# Patient Record
Sex: MALE | Race: WHITE | NOT HISPANIC OR LATINO | Employment: STUDENT | URBAN - METROPOLITAN AREA
[De-identification: names, ages, dates, MRNs, and addresses within clinical notes are randomized per-mention and may not be internally consistent; named-entity substitution may affect disease eponyms.]

---
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
## 2023-11-03 ENCOUNTER — OFFICE VISIT (OUTPATIENT)
Dept: FAMILY MEDICINE CLINIC | Facility: CLINIC | Age: 23
End: 2023-11-03
Payer: COMMERCIAL

## 2023-11-03 ENCOUNTER — PATIENT ROUNDING (BHMG ONLY) (OUTPATIENT)
Dept: FAMILY MEDICINE CLINIC | Facility: CLINIC | Age: 23
End: 2023-11-03
Payer: COMMERCIAL

## 2023-11-03 VITALS
OXYGEN SATURATION: 98 % | RESPIRATION RATE: 20 BRPM | HEART RATE: 66 BPM | BODY MASS INDEX: 33.43 KG/M2 | HEIGHT: 67 IN | SYSTOLIC BLOOD PRESSURE: 148 MMHG | WEIGHT: 213 LBS | DIASTOLIC BLOOD PRESSURE: 96 MMHG

## 2023-11-03 DIAGNOSIS — Z31.69 INFERTILITY COUNSELING: Primary | ICD-10-CM

## 2023-11-03 DIAGNOSIS — G89.29 CHRONIC LOW BACK PAIN WITHOUT SCIATICA, UNSPECIFIED BACK PAIN LATERALITY: ICD-10-CM

## 2023-11-03 DIAGNOSIS — M54.50 CHRONIC LOW BACK PAIN WITHOUT SCIATICA, UNSPECIFIED BACK PAIN LATERALITY: ICD-10-CM

## 2023-11-03 NOTE — PROGRESS NOTES
A Isentio message has been sent to the patient for PATIENT ROUNDING with [facility] [place]

## 2023-11-03 NOTE — PROGRESS NOTES
Han Mcconnell,   [facility]  [address]  [place] [address] [phone]    Subjective      Name Shilo Lyon Jr. MRN [id number]    2000 AGE/SEX 22 y.o. / male      Chief Complaint Chief Complaint   Patient presents with    Establish Care     New patient here to establish care          Visit History for  2023    History of Present Illness  Shilo Lyon Jr. is a 22 y.o. male who presented today for Establish Care (New patient here to establish care )    Has been a long period of time since patient has been to a primary care physician. Had tonsils removed as a child. Does not frequently become ill, except from abiel COVID-19 in the last 2 years and intermittent allergies. Works at [employer] as a . 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 Tried to catch the box and believes sciatic nerve was pinched because of experiencing shooting pain down lower extremities. No longer feels symptoms.     Picked up full time college classes in business administration in the last month. Works 11 hour shifts from 2:00 PM to 1:00 AM 5 days out of the week, sometimes 6. Feels overwhelmed and is concerned it is impacting blood pressure. Has had this issue for about 2 years but waited to see a physician. Does not own a blood pressure cuff. Systolic pressure checked today, 2023, at 152 then rechecked at 140.    Weight was 170 pounds is 2017. Weight is 213 pounds today, 2023. History of diabetes on father's side. No known medical issues in parents or siblings. Parents are still living.     Not taking any medications currently. Highly allergic to penicillin.     Wife, who is having a surge, was seen in office and expressed concern about trying for pregnancy. Has been keeping track of cycles and using ovulation kits. Is concerned about chances of pregnancy. Is slightly concerned about  "fertility issues, but does not think there is anything serious going on. Wife's parents took 2 or 3 years to have her, then quickly had 2 children after. Expressed that he is 22-years-old and she is 21-years-old so there is no rush and he is not very concerned. Have been trying for pregnancy for 2 years, aside from a 4 month break due to stress.      Medications and Allergies   No current outpatient medications  Allergies   Allergen Reactions    Amoxicillin Anaphylaxis    Penicillins Anaphylaxis      I have reviewed the above medications and allergies     Objective:      Vitals Vitals:    11/03/23 0946 11/03/23 1005   BP: 152/100 148/96   BP Location: Right arm Right arm   Patient Position: Sitting Sitting   Cuff Size: Adult Adult   Pulse: 66    Resp: 20    SpO2: 98%    Weight: 96.6 kg (213 lb)    Height: 170.2 cm (67\")      Body mass index is 33.36 kg/m².    Physical Exam  Vitals reviewed.   Constitutional:       General: He is not in acute distress.     Appearance: He is not ill-appearing.   Cardiovascular:      Rate and Rhythm: Normal rate and regular rhythm.   Pulmonary:      Effort: Pulmonary effort is normal.      Breath sounds: Normal breath sounds.   Neurological:      Mental Status: He is alert.   Psychiatric:         Mood and Affect: Mood normal.         Behavior: Behavior normal.         Thought Content: Thought content normal.         Judgment: Judgment normal.            Assessment/Plan      Issues Addressed/ Plan  1. Infertility counseling  -Educated on the factor that stress plays in trying for pregnancy. Extended offer for semen analysis, which he is willing to do. Advised to try relaxation techniques to try and lower stress. Advised to go outside and be exposed to sunlight to increase testosterone, or to start taking vitamin D, zinc, or a multivitamin.     2. Chronic low back pain without sciatica, unspecified back pain laterality  - Advised patient to be more careful when lifting heavy items and " avoid if possible. Educated about sciatic nerve pain and how there is a chance of return. If he has pain while at home, advised him to reach out for an adjustment.     There are no Patient Instructions on file for this visit.  BMI is >= 30 and <35. (Class 1 Obesity). The following options were offered after discussion;: exercise counseling/recommendations and nutrition counseling/recommendations        Follow up  recommended Return in about 1 month (around 12/3/2023) for Blood Pressure.   - Dragon voice recognition software was utilized to complete this chart.  Every reasonable attempt was made to edit and correct the text, however some incorrect words may remain.   Han Mcconnell DO    Transcribed from ambient dictation for Han Mcconnell DO by Lis Serrano.  11/03/23   14:12 EDT    Patient or patient representative verbalized consent to the visit recording.  I have personally performed the services described in this document as transcribed by the above individual, and it is both accurate and complete.

## 2023-12-01 ENCOUNTER — OFFICE VISIT (OUTPATIENT)
Dept: FAMILY MEDICINE CLINIC | Facility: CLINIC | Age: 23
End: 2023-12-01
Payer: COMMERCIAL

## 2023-12-01 VITALS
DIASTOLIC BLOOD PRESSURE: 86 MMHG | WEIGHT: 214 LBS | SYSTOLIC BLOOD PRESSURE: 158 MMHG | RESPIRATION RATE: 18 BRPM | HEIGHT: 67 IN | BODY MASS INDEX: 33.59 KG/M2 | OXYGEN SATURATION: 98 % | HEART RATE: 80 BPM

## 2023-12-01 DIAGNOSIS — I10 HYPERTENSION, UNSPECIFIED TYPE: Primary | ICD-10-CM

## 2023-12-01 DIAGNOSIS — R42 LIGHTHEADED: ICD-10-CM

## 2023-12-01 PROCEDURE — 99213 OFFICE O/P EST LOW 20 MIN: CPT | Performed by: STUDENT IN AN ORGANIZED HEALTH CARE EDUCATION/TRAINING PROGRAM

## 2023-12-01 RX ORDER — LISINOPRIL 10 MG/1
10 TABLET ORAL DAILY
Qty: 30 TABLET | Refills: 0 | Status: SHIPPED | OUTPATIENT
Start: 2023-12-01

## 2023-12-01 NOTE — PROGRESS NOTES
"    Han Mcconnell DO  [facility] PRIMARY CARE  [address] [place] [address] [address]  [place] [address] [phone]    Subjective      Name Shilo Lyon Jr. MRN [id number]    2000 AGE/SEX 23 y.o. / male      Chief Complaint Chief Complaint   Patient presents with    Hypertension     1 month follow up for blood pressure          Visit History for  2023    History of Present Illness  Shilo Lyon Jr. is a 23 y.o. male who presented today for Hypertension (1 month follow up for blood pressure )    Blood pressure is still high. Has not been checking blood pressures at home. Wonders if this is a sign of high blood pressure, stress, or anxiety. For approximately 1 month more or less, when becoming excited or has been under pressure, will feel slightly off balance and slightly forgetful. Drinks 2 Cokes a day. Does not drink energy drinks. Does not drink coffee. Does not feel that heart is beating fast. Occasionally feels pressure. Stays up for 1 to 1.5 hours when going home from work.    Back is doing good.    Prefers to be updated on the fertility test.    Family history of diabetes.      Medications and Allergies   Current Outpatient Medications   Medication Instructions    lisinopril (PRINIVIL,ZESTRIL) 10 mg, Oral, Daily     Allergies   Allergen Reactions    Amoxicillin Anaphylaxis    Penicillins Anaphylaxis      I have reviewed the above medications and allergies     Objective:      Vitals Vitals:    23 1115   BP: 158/86   BP Location: Right arm   Patient Position: Sitting   Cuff Size: Adult   Pulse: 80   Resp: 18   SpO2: 98%   Weight: 97.1 kg (214 lb)   Height: 170.2 cm (67\")     Body mass index is 33.52 kg/m².    Physical Exam  Vitals reviewed.   Constitutional:       General: He is not in acute distress.     Appearance: He is not ill-appearing.   Cardiovascular:      Rate and Rhythm: Normal rate and regular rhythm.   Pulmonary:      Effort: Pulmonary effort is normal.      Breath " sounds: Normal breath sounds.   Neurological:      Mental Status: He is alert.   Psychiatric:         Mood and Affect: Mood normal.         Behavior: Behavior normal.         Thought Content: Thought content normal.         Judgment: Judgment normal.            Assessment/Plan      Issues Addressed/ Plan  1. Hypertension, unspecified type  - Will be ordered some blood tests to look at liver and kidney function, blood glucose levels, and thyroid function.  - Advised to check blood pressures every other day at home at the most relaxed times.  - Will be prescribed a high blood pressure medication of lisinopril (Prinivil, Zestril).  - Advised to follow up in 1 month for update on blood pressures.  - Comprehensive metabolic panel; Future  - TSH; Future  - T4, free; Future  - CBC w AUTO Differential; Future  - Lipid panel; Future  - lisinopril (PRINIVIL,ZESTRIL) 10 MG tablet; Take 1 tablet by mouth Daily.  Dispense: 30 tablet; Refill: 0    2. Lightheaded  - Comprehensive metabolic panel; Future  - TSH; Future  - T4, free; Future  - CBC w AUTO Differential; Future  - Lipid panel; Future     There are no Patient Instructions on file for this visit.         Follow up  recommended Return in about 1 month (around 1/1/2024) for Blood Pressure.   - Dragon voice recognition software was utilized to complete this chart.  Every reasonable attempt was made to edit and correct the text, however some incorrect words may remain.       Transcribed from ambient dictation for Han Mcconnell DO by Priti Negron.  12/01/23   14:11 EST    Patient or patient representative verbalized consent to the visit recording.  I have personally performed the services described in this document as transcribed by the above individual, and it is both accurate and complete.

## 2023-12-04 ENCOUNTER — TELEPHONE (OUTPATIENT)
Dept: FAMILY MEDICINE CLINIC | Facility: CLINIC | Age: 23
End: 2023-12-04
Payer: COMMERCIAL

## 2023-12-04 DIAGNOSIS — Z30.09 FAMILY PLANNING SERVICES: Primary | ICD-10-CM

## 2023-12-07 NOTE — TELEPHONE ENCOUNTER
May want to call patient as well and let him know that this is what we are doing in order to get his levels checked.  He will be expecting a referral, and was just thinking he was going to need a lab.

## 2023-12-29 DIAGNOSIS — I10 HYPERTENSION, UNSPECIFIED TYPE: ICD-10-CM

## 2023-12-29 RX ORDER — LISINOPRIL 10 MG/1
10 TABLET ORAL DAILY
Qty: 30 TABLET | Refills: 0 | Status: SHIPPED | OUTPATIENT
Start: 2023-12-29

## 2023-12-29 NOTE — TELEPHONE ENCOUNTER
Caller: Shilo Lyon Jr.    Relationship: Self    Best call back number: [phone]     Requested Prescriptions:   Requested Prescriptions     Pending Prescriptions Disp Refills    lisinopril (PRINIVIL,ZESTRIL) 10 MG tablet 30 tablet 0     Sig: Take 1 tablet by mouth Daily.        Pharmacy where request should be sent: [facility] [id number] [facility] [address] - [phone] PH - [phone] FX     Last office visit with prescribing clinician: 12/1/2023   Last telemedicine visit with prescribing clinician: Visit date not found   Next office visit with prescribing clinician: 1/10/2024     Additional details provided by patient: PATIENT STATES THAT HE HAS 4-5 TABLETS REMAINING    Does the patient have less than a 3 day supply:  [] Yes  [x] No    Would you like a call back once the refill request has been completed: [] Yes [x] No    If the office needs to give you a call back, can they leave a voicemail: [] Yes [x] No    Lissette Lindsay Rep   12/29/23 16:23 EST        normal... Well appearing, awake, alert, oriented to person, place, time/situation and in no apparent distress.

## 2024-01-02 RX ORDER — LISINOPRIL 10 MG/1
10 TABLET ORAL DAILY
Qty: 30 TABLET | Refills: 0 | OUTPATIENT
Start: 2024-01-02

## 2024-01-10 ENCOUNTER — OFFICE VISIT (OUTPATIENT)
Dept: FAMILY MEDICINE CLINIC | Facility: CLINIC | Age: 24
End: 2024-01-10
Payer: COMMERCIAL

## 2024-01-10 VITALS
TEMPERATURE: 97.7 F | HEART RATE: 72 BPM | DIASTOLIC BLOOD PRESSURE: 62 MMHG | SYSTOLIC BLOOD PRESSURE: 124 MMHG | BODY MASS INDEX: 34.14 KG/M2 | WEIGHT: 218 LBS | OXYGEN SATURATION: 98 %

## 2024-01-10 DIAGNOSIS — I10 HYPERTENSION, UNSPECIFIED TYPE: Primary | ICD-10-CM

## 2024-01-10 DIAGNOSIS — Z30.09 FAMILY PLANNING SERVICES: ICD-10-CM

## 2024-01-10 DIAGNOSIS — R42 LIGHTHEADED: ICD-10-CM

## 2024-01-10 NOTE — PROGRESS NOTES
"    Han Mcconnell DO  [facility] PRIMARY [facility]ARE  [address] [place] [address] [address]  [place] [address] [phone]    Subjective      Name Shilo Lyon Jr. MRN [id number]    2000 AGE/SEX 23 y.o. / male      Chief Complaint Chief Complaint   Patient presents with    Hypertension         Visit History for  01/10/2024    History of Present Illness  Shilo Lyon Jr. is a 23 y.o. male who presented today for Hypertension    Presents for a follow-up on hypertension.     Has been monitoring blood pressure at home. Checks blood pressure before going to work and when getting home. Blood pressure ranges from 130 to 135 systolic. Has seen blood pressure in the 140 to 141 systolic range once or twice. Has always felt \"off balance,\" but this has subsided. Recently started a new job.    Has not received a call from the urologist. .        Medications and Allergies   Current Outpatient Medications   Medication Instructions    lisinopril (PRINIVIL,ZESTRIL) 10 mg, Oral, Daily     Allergies   Allergen Reactions    Amoxicillin Anaphylaxis    Penicillins Anaphylaxis      I have reviewed the above medications and allergies     Objective:      Vitals Vitals:    01/10/24 1047   BP: 124/62   Pulse: 72   Temp: 97.7 °F (36.5 °C)   SpO2: 98%   Weight: 98.9 kg (218 lb)     Body mass index is 34.14 kg/m².    Physical Exam  Vitals reviewed.   Constitutional:       General: He is not in acute distress.     Appearance: He is not ill-appearing.   Pulmonary:      Effort: Pulmonary effort is normal.   Psychiatric:         Mood and Affect: Mood normal.         Behavior: Behavior normal.         Thought Content: Thought content normal.         Judgment: Judgment normal.            Assessment/Plan      Issues Addressed/ Plan   Diagnosis Plan   1. Hypertension, unspecified type  Comprehensive metabolic panel    CBC w AUTO Differential    TSH    T4, free    Lipid panel      2. Lightheaded  Comprehensive metabolic panel    CBC w AUTO " Differential    TSH    T4, free    Lipid panel      3. Family planning services  Ambulatory Referral to Urology         There are no Patient Instructions on file for this visit.     1. Hypertension  - Blood pressure has improved. Will continue lisinopril. Updated blood work will be obtained today. Advised to continue tracking blood pressure every other day in the afternoon when feeling most relaxed.    2. Family planning services  - A referral to urology has been resent.           Follow up  recommended Return in about 3 months (around 4/10/2024) for Blood Pressure.   - Dragon voice recognition software was utilized to complete this chart.  Every reasonable attempt was made to edit and correct the text, however some incorrect words may remain.   Han Mcconnell DO    Transcribed from ambient dictation for Han Mcconnell DO by Lis Serrano.  01/10/24   13:10 EST    Patient or patient representative verbalized consent to the visit recording.  I have personally performed the services described in this document as transcribed by the above individual, and it is both accurate and complete.

## 2024-01-10 NOTE — PROGRESS NOTES
Venipuncture Blood Specimen Collection  Venipuncture performed in left arm by Sonja Schmitt MA with good hemostasis. Patient tolerated the procedure well without complications.   01/10/24   Sonja Schmitt MA

## 2024-01-11 LAB
ALBUMIN SERPL-MCNC: 4.6 G/DL (ref 3.5–5.2)
ALBUMIN/GLOB SERPL: 2.1 G/DL
ALP SERPL-CCNC: 63 U/L (ref 39–117)
ALT SERPL-CCNC: 21 U/L (ref 1–41)
AST SERPL-CCNC: 18 U/L (ref 1–40)
BASOPHILS # BLD AUTO: 0.04 10*3/MM3 (ref 0–0.2)
BASOPHILS NFR BLD AUTO: 0.6 % (ref 0–1.5)
BILIRUB SERPL-MCNC: 0.5 MG/DL (ref 0–1.2)
BUN SERPL-MCNC: 12 MG/DL (ref 6–20)
BUN/CREAT SERPL: 11.8 (ref 7–25)
CALCIUM SERPL-MCNC: 9.8 MG/DL (ref 8.6–10.5)
CHLORIDE SERPL-SCNC: 102 MMOL/L (ref 98–107)
CHOLEST SERPL-MCNC: 171 MG/DL (ref 0–200)
CO2 SERPL-SCNC: 24.4 MMOL/L (ref 22–29)
CREAT SERPL-MCNC: 1.02 MG/DL (ref 0.76–1.27)
EGFRCR SERPLBLD CKD-EPI 2021: 105.9 ML/MIN/1.73
EOSINOPHIL # BLD AUTO: 0.2 10*3/MM3 (ref 0–0.4)
EOSINOPHIL NFR BLD AUTO: 3.2 % (ref 0.3–6.2)
ERYTHROCYTE [DISTWIDTH] IN BLOOD BY AUTOMATED COUNT: 12.9 % (ref 12.3–15.4)
GLOBULIN SER CALC-MCNC: 2.2 GM/DL
GLUCOSE SERPL-MCNC: 88 MG/DL (ref 65–99)
HCT VFR BLD AUTO: 45 % (ref 37.5–51)
HDLC SERPL-MCNC: 39 MG/DL (ref 40–60)
HGB BLD-MCNC: 15.7 G/DL (ref 13–17.7)
IMM GRANULOCYTES # BLD AUTO: 0.02 10*3/MM3 (ref 0–0.05)
IMM GRANULOCYTES NFR BLD AUTO: 0.3 % (ref 0–0.5)
LDLC SERPL CALC-MCNC: 88 MG/DL (ref 0–100)
LYMPHOCYTES # BLD AUTO: 2.9 10*3/MM3 (ref 0.7–3.1)
LYMPHOCYTES NFR BLD AUTO: 45.9 % (ref 19.6–45.3)
MCH RBC QN AUTO: 31.6 PG (ref 26.6–33)
MCHC RBC AUTO-ENTMCNC: 34.9 G/DL (ref 31.5–35.7)
MCV RBC AUTO: 90.5 FL (ref 79–97)
MONOCYTES # BLD AUTO: 0.39 10*3/MM3 (ref 0.1–0.9)
MONOCYTES NFR BLD AUTO: 6.2 % (ref 5–12)
NEUTROPHILS # BLD AUTO: 2.77 10*3/MM3 (ref 1.7–7)
NEUTROPHILS NFR BLD AUTO: 43.8 % (ref 42.7–76)
NRBC BLD AUTO-RTO: 0.2 /100 WBC (ref 0–0.2)
PLATELET # BLD AUTO: 287 10*3/MM3 (ref 140–450)
POTASSIUM SERPL-SCNC: 4.2 MMOL/L (ref 3.5–5.2)
PROT SERPL-MCNC: 6.8 G/DL (ref 6–8.5)
RBC # BLD AUTO: 4.97 10*6/MM3 (ref 4.14–5.8)
SODIUM SERPL-SCNC: 137 MMOL/L (ref 136–145)
T4 FREE SERPL-MCNC: 1.51 NG/DL (ref 0.93–1.7)
TRIGL SERPL-MCNC: 263 MG/DL (ref 0–150)
TSH SERPL DL<=0.005 MIU/L-ACNC: 1.46 UIU/ML (ref 0.27–4.2)
VLDLC SERPL CALC-MCNC: 44 MG/DL (ref 5–40)
WBC # BLD AUTO: 6.32 10*3/MM3 (ref 3.4–10.8)

## 2024-01-29 DIAGNOSIS — I10 HYPERTENSION, UNSPECIFIED TYPE: ICD-10-CM

## 2024-01-29 RX ORDER — LISINOPRIL 10 MG/1
10 TABLET ORAL DAILY
Qty: 30 TABLET | Refills: 2 | Status: SHIPPED | OUTPATIENT
Start: 2024-01-29

## 2024-04-12 ENCOUNTER — OFFICE VISIT (OUTPATIENT)
Dept: FAMILY MEDICINE CLINIC | Facility: CLINIC | Age: 24
End: 2024-04-12
Payer: COMMERCIAL

## 2024-04-12 VITALS
HEART RATE: 90 BPM | RESPIRATION RATE: 18 BRPM | SYSTOLIC BLOOD PRESSURE: 124 MMHG | BODY MASS INDEX: 34.06 KG/M2 | OXYGEN SATURATION: 97 % | DIASTOLIC BLOOD PRESSURE: 78 MMHG | WEIGHT: 217 LBS | HEIGHT: 67 IN

## 2024-04-12 DIAGNOSIS — F41.9 SEVERE ANXIETY: Primary | ICD-10-CM

## 2024-04-12 PROCEDURE — 99213 OFFICE O/P EST LOW 20 MIN: CPT | Performed by: STUDENT IN AN ORGANIZED HEALTH CARE EDUCATION/TRAINING PROGRAM

## 2024-04-12 RX ORDER — BUSPIRONE HYDROCHLORIDE 5 MG/1
5 TABLET ORAL 3 TIMES DAILY
Qty: 90 TABLET | Refills: 2 | Status: SHIPPED | OUTPATIENT
Start: 2024-04-12

## 2024-04-12 NOTE — PROGRESS NOTES
Han Mcconnell DO  [facility] PRIMARY CARE  [address]  [place] [address] [phone]    Subjective      Name Shilo Lyon Jr. MRN [id number]    2000 AGE/SEX 23 y.o. / male      Chief Complaint Chief Complaint   Patient presents with    Hypertension     Follow up. Feels like high blood pressure could be forced by stress and anxiety.          Visit History for  2024    History of Present Illness  Shilo Lyon Jr. is a 23 y.o. male who presented today for Hypertension (Follow up. Feels like high blood pressure could be forced by stress and anxiety. )     The patient's blood pressure has shown significant improvement, which he attributes to stress and anxiety. He reports difficulty in relaxing and recently experienced pain in the posterior aspect of his arm, which he attributes to improper positioning of his hands. He also experiences pain at the top of his chest, extending into his first ray of the clavicle, which he describes as an uncomfortable sensation. The pain also radiates down the back of his neck on the left side, with occasional numbness and tingling down his hand. He denies any radiating pain down the arms or sides. He occasionally experiences feelings of disconnection, particularly during conversations, which he believes began with stress and anxiety. Recently, he has noticed an increase in bruxism. He initially attributed these symptoms to his blood pressure, but he has been monitoring his blood pressure, which tends to spike during periods of anxiety or stress. His symptoms have worsened, with increased disorientation, occasional dizziness, and chest pain. His relaxation routine involves working from 3:00 PM to 2:00 AM, resting, and waking up at 10:30 AM for school. He began working as a  in 2023, which initially contributed to his symptoms. However, since commencing school and a new job, his symptoms have not  "improved. He believes he sleeps sufficiently.        Medications and Allergies   Current Outpatient Medications   Medication Instructions    busPIRone (BUSPAR) 5 mg, Oral, 3 Times Daily    lisinopril (PRINIVIL,ZESTRIL) 10 mg, Oral, Daily     Allergies   Allergen Reactions    Amoxicillin Anaphylaxis    Penicillins Anaphylaxis      I have reviewed the above medications and allergies     Objective:      Vitals Vitals:    04/12/24 1032   BP: 124/78   BP Location: Right arm   Patient Position: Sitting   Cuff Size: Large Adult   Pulse: 90   Resp: 18   SpO2: 97%   Weight: 98.4 kg (217 lb)   Height: 170.2 cm (67\")     Body mass index is 33.99 kg/m².    Physical Exam  Vitals reviewed.   Constitutional:       General: He is not in acute distress.     Appearance: He is not ill-appearing.   Pulmonary:      Effort: Pulmonary effort is normal.   Psychiatric:         Mood and Affect: Mood normal.         Behavior: Behavior normal.         Thought Content: Thought content normal.         Judgment: Judgment normal.       Physical Exam     Results          Assessment/Plan      Issues Addressed/ Plan   Diagnosis Plan   1. Severe anxiety  busPIRone (BUSPAR) 5 MG tablet         Assessment & Plan  1. Anxiety.  The patient's FABIO-7 score is 13, indicative of severe anxiety. We explored various strategies to manage his anxiety, including strategies for coping and relaxation, warm baths with Epsom salts, meditation, mindfulness, and therapeutic intervention. The possibility of initiating medication was also discussed. A prescription for BuSpar 5 mg was issued, with instructions for the patient to initially take it once or twice daily during high-stress periods. If the patient finds the side effect profile manageable, he can then increase the dosage to three times daily. The potential side effects, risks, and benefits were thoroughly discussed with the patient. A follow-up FABIO-7 score will be conducted during his next visit. The patient will " maintain his lisinopril regimen as it appears to be effective. Should BuSpar prove ineffective, the next course of action will be to consider Zoloft, typically at a dosage of 50 to 100 mg.    2. Shoulder pain.  The patient carries significant stress in his shoulders. The patient was advised to apply Biofreeze to the affected area prior to sleep. Additionally, he was advised to perform door stretches.    Follow-up  The patient is scheduled for a follow-up visit in 1 month.           There are no Patient Instructions on file for this visit.        Follow up  recommended Return in about 1 month (around 5/12/2024) for Anxiety.   - Dragon voice recognition software was utilized to complete this chart.  Every reasonable attempt was made to edit and correct the text, however some incorrect words may remain.   Han Mcconnell DO    Patient or patient representative verbalized consent for the use of Ambient Listening during the visit with  Han Mcconnell DO for chart documentation. 4/29/2024  07:45 EDT

## 2024-04-26 DIAGNOSIS — I10 HYPERTENSION, UNSPECIFIED TYPE: ICD-10-CM

## 2024-04-26 RX ORDER — LISINOPRIL 10 MG/1
10 TABLET ORAL DAILY
Qty: 30 TABLET | Refills: 0 | Status: SHIPPED | OUTPATIENT
Start: 2024-04-26

## 2024-05-15 ENCOUNTER — OFFICE VISIT (OUTPATIENT)
Dept: FAMILY MEDICINE CLINIC | Facility: CLINIC | Age: 24
End: 2024-05-15
Payer: COMMERCIAL

## 2024-05-15 VITALS
HEIGHT: 67 IN | HEART RATE: 95 BPM | WEIGHT: 220 LBS | DIASTOLIC BLOOD PRESSURE: 82 MMHG | SYSTOLIC BLOOD PRESSURE: 118 MMHG | OXYGEN SATURATION: 98 % | BODY MASS INDEX: 34.53 KG/M2

## 2024-05-15 DIAGNOSIS — F41.9 SEVERE ANXIETY: Primary | ICD-10-CM

## 2024-05-15 DIAGNOSIS — I10 HYPERTENSION, UNSPECIFIED TYPE: ICD-10-CM

## 2024-05-15 PROCEDURE — 99214 OFFICE O/P EST MOD 30 MIN: CPT | Performed by: STUDENT IN AN ORGANIZED HEALTH CARE EDUCATION/TRAINING PROGRAM

## 2024-05-15 RX ORDER — LISINOPRIL 5 MG/1
5 TABLET ORAL DAILY
Qty: 30 TABLET | Refills: 1 | Status: SHIPPED | OUTPATIENT
Start: 2024-05-15

## 2024-05-31 NOTE — PROGRESS NOTES
Han Mcconnell,   [facility] PRIMARY [facility]ARE  [address]  [place] [address] [phone]    Subjective      Name Shilo Lyon Jr. MRN [id number]    2000 AGE/SEX 23 y.o. / male      Chief Complaint Chief Complaint   Patient presents with    Anxiety         Visit History for  05/15/2024    History of Present Illness  Shilo Lyon Jr. is a 23 y.o. male who presented today for Anxiety      History of Present Illness  The patient presents for evaluation of multiple medical concerns.    The patient has been on a medication regimen for approximately 1.5 weeks. Initially, the medication induced fatigue and lightheadedness, however, he continued to take it, albeit with difficulties. He acknowledges anxiety, which he attributes to extensive research, but is uncertain of its trigger or exacerbating factors. His symptoms have significantly decreased. Initially, he was prescribed BuSpar thrice daily, but subsequently reduced to one tablet daily. He expresses uncertainty about the necessity of further increasing the dosage. He does not perceive any improvement with the use of BuSpar. Currently, he is on a summer break and anticipates extending his school schedule for another 2 weeks. He and his wife have been engaging in activities such as walking on weekends, which he finds beneficial in clearing his mind. He has not experienced any severe symptoms in the past few weeks. He has plans to take a 6-week intensive 6-week summer courses instead of 8, in addition to his 50-hour work schedule. He typically takes 2 to 3 classes every 8 weeks, but due to his work schedule, he now only takes one class every 6 weeks. 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 He spends approximately 134 people at his workplace. Prior to his current job, he was not a perfectionist, but expressed a desire to perform everything to the best of his ability.  "However, his current job does not provide adequate coverage, leading to him sitting back and realizing not to overcome stress.    The patient's blood pressure has decreased and is now within the 110s.        Medications and Allergies   Current Outpatient Medications   Medication Instructions    lisinopril (PRINIVIL,ZESTRIL) 5 mg, Oral, Daily     Allergies   Allergen Reactions    Amoxicillin Anaphylaxis    Penicillins Anaphylaxis      I have reviewed the above medications and allergies     Objective:      Vitals Vitals:    05/15/24 1118   BP: 118/82   Pulse: 95   SpO2: 98%   Weight: 99.8 kg (220 lb)   Height: 170.2 cm (67.01\")     Body mass index is 34.45 kg/m².    Physical Exam  Physical Exam  Vital Signs  The patient's blood pressure is 118.   Results          Assessment/Plan      Issues Addressed/ Plan   Diagnosis Plan   1. Hypertension, unspecified type  lisinopril (PRINIVIL,ZESTRIL) 5 MG tablet         Assessment & Plan  1. Anxiety.  The patient's anxiety appears to be situational in nature. The patient is advised to discontinue buspirone for the time being, but should maintain its use in the event of recurrence of symptoms. He is also encouraged to engage in activities such as walking, sitting quietly in the evenings, or taking 5 minutes to refocus himself.    2. Hypertension.  The patient's blood pressure has decreased and is currently within the 110s. The patient is advised to reduce his lisinopril dosage to 5 mg for a week and monitor his blood pressure at home. Should his blood pressure remain in the 110s after a week, he is to discontinue lisinopril.    Follow-up  The patient is scheduled for a follow-up visit in 4 months.           There are no Patient Instructions on file for this visit.        Follow up  recommended Return in about 4 months (around 9/15/2024) for anxiety.   - Dragon voice recognition software was utilized to complete this chart.  Every reasonable attempt was made to edit and correct the " text, however some incorrect words may remain.   Han Mcconnell DO    Patient or patient representative verbalized consent for the use of Ambient Listening during the visit with  Han Mcconnell DO for chart documentation. 5/31/2024  07:19 EDT

## 2024-09-16 ENCOUNTER — OFFICE VISIT (OUTPATIENT)
Dept: FAMILY MEDICINE CLINIC | Facility: CLINIC | Age: 24
End: 2024-09-16
Payer: COMMERCIAL

## 2024-09-16 VITALS
OXYGEN SATURATION: 97 % | DIASTOLIC BLOOD PRESSURE: 82 MMHG | BODY MASS INDEX: 35.94 KG/M2 | SYSTOLIC BLOOD PRESSURE: 138 MMHG | WEIGHT: 229 LBS | HEIGHT: 67 IN | HEART RATE: 88 BPM

## 2024-09-16 DIAGNOSIS — F41.9 SEVERE ANXIETY: Primary | ICD-10-CM

## 2024-09-16 PROCEDURE — 99213 OFFICE O/P EST LOW 20 MIN: CPT | Performed by: STUDENT IN AN ORGANIZED HEALTH CARE EDUCATION/TRAINING PROGRAM

## 2024-12-23 ENCOUNTER — OFFICE VISIT (OUTPATIENT)
Dept: FAMILY MEDICINE CLINIC | Facility: CLINIC | Age: 24
End: 2024-12-23
Payer: COMMERCIAL

## 2024-12-23 VITALS
OXYGEN SATURATION: 98 % | DIASTOLIC BLOOD PRESSURE: 88 MMHG | WEIGHT: 228 LBS | SYSTOLIC BLOOD PRESSURE: 154 MMHG | HEIGHT: 67 IN | HEART RATE: 78 BPM | RESPIRATION RATE: 18 BRPM | BODY MASS INDEX: 35.79 KG/M2

## 2024-12-23 DIAGNOSIS — I10 HYPERTENSION, UNSPECIFIED TYPE: Primary | ICD-10-CM

## 2024-12-23 DIAGNOSIS — F41.9 SEVERE ANXIETY: ICD-10-CM

## 2024-12-23 DIAGNOSIS — R51.9 ACUTE NONINTRACTABLE HEADACHE, UNSPECIFIED HEADACHE TYPE: ICD-10-CM

## 2024-12-23 DIAGNOSIS — H53.9 CHANGE IN VISION: ICD-10-CM

## 2024-12-23 PROCEDURE — 99214 OFFICE O/P EST MOD 30 MIN: CPT | Performed by: STUDENT IN AN ORGANIZED HEALTH CARE EDUCATION/TRAINING PROGRAM

## 2024-12-23 RX ORDER — LISINOPRIL 5 MG/1
5 TABLET ORAL DAILY
Qty: 30 TABLET | Refills: 1 | Status: SHIPPED | OUTPATIENT
Start: 2024-12-23

## 2025-01-11 PROBLEM — I10 HYPERTENSION: Status: ACTIVE | Noted: 2025-01-11

## 2025-01-11 PROBLEM — H53.9 CHANGE IN VISION: Status: ACTIVE | Noted: 2025-01-11

## 2025-01-11 PROBLEM — F41.9 SEVERE ANXIETY: Status: ACTIVE | Noted: 2025-01-11

## 2025-01-12 NOTE — PROGRESS NOTES
Han Mcconnell DO  [facility]  [address]  [place] [address] [phone]    Subjective      Name Shilo Lyon Jr. MRN [id number]    2000 AGE/SEX 24 y.o. / male      Chief Complaint Chief Complaint   Patient presents with    Anxiety     3 month check up          Visit History for  2024    Shilo Lyon Jr. is a 24 y.o. male who presented today for Anxiety (3 month check up )       History of Present Illness  He reports a general sense of well-being but has been experiencing a pulsating sensation on the left side of his head, specifically above the ear. This sensation does not resemble a typical headache and is described as a twitching feeling. He recalls an incident on Saturday night when he was lying down watching a movie without any headache, but upon getting up to go to bed, he experienced a spasm-like sensation. There is no associated pain with these episodes. 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 His last eye examination was conducted some time ago. He occasionally experiences light sensitivity, which he finds challenging when attempting to read in well-lit environments, such as his workplace. He does not regularly monitor his blood pressure at home.    His anxiety symptoms have improved, although he still experiences occasional imbalance.    He maintains an active lifestyle, achieving approximately 15,000 steps daily through his work. His dietary habits could be improved, but he limits himself to one soda per day and abstains from energy drinks and coffee. He has not gained significant weight since his last visit three months ago.       Medications and Allergies   Current Outpatient Medications   Medication Instructions    lisinopril (PRINIVIL,ZESTRIL) 5 mg, Oral, Daily     Allergies   Allergen Reactions     "Amoxicillin Anaphylaxis    Penicillins Anaphylaxis      I have reviewed the above medications and allergies     Objective:      Vitals Vitals:    12/23/24 1035   BP: 154/88   BP Location: Left arm   Patient Position: Sitting   Cuff Size: Adult   Pulse: 78   Resp: 18   SpO2: 98%   Weight: 103 kg (228 lb)   Height: 170.2 cm (67.01\")     Body mass index is 35.7 kg/m².    Physical Exam  Vitals reviewed.   Constitutional:       General: He is not in acute distress.     Appearance: He is not ill-appearing.   Pulmonary:      Effort: Pulmonary effort is normal.   Psychiatric:         Mood and Affect: Mood normal.         Behavior: Behavior normal.         Thought Content: Thought content normal.         Judgment: Judgment normal.          Physical Exam       Results       Assessment/Plan   Issues Addressed/ Plan   Diagnosis Plan   1. Hypertension, unspecified type  lisinopril (PRINIVIL,ZESTRIL) 5 MG tablet      2. Acute nonintractable headache, unspecified headache type        3. Change in vision        4. Severe anxiety           Assessment & Plan  1. Hypertension.  His blood pressure readings were elevated during this visit. He has not gained weight since his last visit 3 months ago. His blood work was normal earlier this year. He is advised to resume his lisinopril 5 mg regimen and monitor his blood pressure over the next few weeks to determine if there is a correlation with his reported head symptoms. A weight loss of approximately 10 pounds could potentially resolve his hypertension. A prescription refill for lisinopril 5 mg has been provided.    2. Left-sided head pulsation.  He reports a pulsating sensation on the left side of his head, which does not feel like a typical headache. This could be due to eye fatigue, hypertension, or a migraine-type headache. He is advised to monitor his blood pressure to see if it coincides with these symptoms.    3. Vision changes.  He reports slight deterioration in his vision, " especially at medium to long range, and occasional light sensitivity. An ophthalmological examination is recommended to rule out any underlying issues and to determine if these symptoms are related to his hypertension.    4. Anxiety.  His anxiety symptoms have improved, although he still experiences occasional imbalance.    Follow-up  The patient will follow up in 3 months for a physical examination and blood work.     Class 2 Severe Obesity (BMI >=35 and <=39.9). Obesity-related health conditions include the following: hypertension. Obesity is unchanged. BMI is is above average; BMI management plan is completed. We discussed portion control and increasing exercise.     There are no Patient Instructions on file for this visit.   Follow up  recommended Return in about 3 months (around 3/23/2025) for Annual physical.   - Dragon voice recognition software was utilized to complete this chart.  Every reasonable attempt was made to edit and correct the text, however some incorrect words may remain.   Han Mcconnell DO    Patient or patient representative verbalized consent for the use of Ambient Listening during the visit with  Han Mcconnell DO for chart documentation. 1/11/2025  22:42 EST

## 2025-03-26 ENCOUNTER — OFFICE VISIT (OUTPATIENT)
Dept: FAMILY MEDICINE CLINIC | Facility: CLINIC | Age: 25
End: 2025-03-26
Payer: COMMERCIAL

## 2025-03-26 VITALS
WEIGHT: 230 LBS | OXYGEN SATURATION: 99 % | HEART RATE: 80 BPM | HEIGHT: 67 IN | DIASTOLIC BLOOD PRESSURE: 122 MMHG | BODY MASS INDEX: 36.1 KG/M2 | SYSTOLIC BLOOD PRESSURE: 174 MMHG

## 2025-03-26 DIAGNOSIS — M54.42 ACUTE LEFT-SIDED LOW BACK PAIN WITH LEFT-SIDED SCIATICA: Primary | ICD-10-CM

## 2025-03-26 DIAGNOSIS — G57.02 PIRIFORMIS SYNDROME, LEFT: ICD-10-CM

## 2025-03-26 PROCEDURE — 99213 OFFICE O/P EST LOW 20 MIN: CPT | Performed by: STUDENT IN AN ORGANIZED HEALTH CARE EDUCATION/TRAINING PROGRAM

## 2025-03-26 RX ORDER — METAXALONE 800 MG/1
800 TABLET ORAL 3 TIMES DAILY
Qty: 51 TABLET | Refills: 0 | Status: SHIPPED | OUTPATIENT
Start: 2025-03-26 | End: 2025-04-12

## 2025-03-26 RX ORDER — MELOXICAM 15 MG/1
15 TABLET ORAL DAILY
Qty: 30 TABLET | Refills: 0 | Status: SHIPPED | OUTPATIENT
Start: 2025-03-26

## 2025-03-26 NOTE — PROGRESS NOTES
Han Mcconnell DO  [facility]  [facility]  [place] [id number] [phone]    Subjective      Name Shilo Lyon Jr. MRN [id number]    2000 AGE/SEX 24 y.o. / male      Chief Complaint Chief Complaint   Patient presents with    Annual Exam     Here for annual physical. Had a back issue a few months ago and still bothering him. Standing straight is uncomfortable. Can't extend knee without pain. Had herniated disc         Visit History for  2025    Shilo Lyon Jr. is a 24 y.o. male who presented today for Annual Exam (Here for annual physical. Had a back issue a few months ago and still bothering him. Standing straight is uncomfortable. Can't extend knee without pain. Had herniated disc)       History of Present Illness  He experienced a severe back injury approximately 2 months ago, which resulted in immobility for 2 days. He sought medical attention at a clinic where a herniated disc was diagnosed. The pain, which he believes is affecting his sciatic nerve, radiates down his left leg. Although there has been some improvement, the pain persists to the extent that it limits his mobility more than his bike. He is unable to fully extend his leg without experiencing hip pain. He initially sought medical attention because he thought he had injured his lower back or spine. He had 3 x-rays done and was prescribed medication, which he later returned to have refilled. The pain is localized to the left lower back, adjacent to the tailbone. He has been using a massage gun for relief. He has noticed a bad habit of shifting weight from his left to his right side when in severe pain. He has been using ice for relief, but this has resulted in increased calf pain and tension. He has attempted physical therapy and home remedies, but these have exacerbated his pain. He was prescribed steroids, one of which significantly elevated his blood pressure. He completed his  "steroid course last Friday and did not return for a follow-up due to dissatisfaction with the treatment plan. He has been on blood pressure medication, which initially kept his readings below 140, but recent readings have been high, possibly due to the pain and other medications. This is not a work-related injury. He has undergone physical therapy, but certain exercises, such as leg extension and butterfly stretches, have worsened his condition. He also experienced increased pain after walking up steps. He continued to work despite the pain, but had to take 5 days off due to numbness in his leg. This break provided some relief, but the pain persists. He has been able to perform some daily activities, such as putting on socks, but with difficulty. He has been using a scooter for mobility and can not walk 6 miles a day as he used to. He has been using ice for relief, but this has resulted in increased calf pain and tension.       Medications and Allergies   Current Outpatient Medications   Medication Instructions    lisinopril (PRINIVIL,ZESTRIL) 5 mg, Oral, Daily    meloxicam (MOBIC) 15 mg, Oral, Daily     Allergies   Allergen Reactions    Amoxicillin Anaphylaxis    Penicillins Anaphylaxis      I have reviewed the above medications and allergies     Objective:      Vitals Vitals:    03/26/25 1059   BP: (!) 174/122   BP Location: Left arm   Patient Position: Sitting   Cuff Size: Adult   Pulse: 80   SpO2: 99%   Weight: 104 kg (230 lb)   Height: 170.2 cm (67.01\")   PainSc: 6      Body mass index is 36.01 kg/m².    Physical Exam  Vitals reviewed.   Constitutional:       General: He is not in acute distress.     Appearance: He is not ill-appearing.   Cardiovascular:      Rate and Rhythm: Normal rate and regular rhythm.   Pulmonary:      Effort: Pulmonary effort is normal.      Breath sounds: Normal breath sounds.   Neurological:      Mental Status: He is alert.   Psychiatric:         Mood and Affect: Mood normal.         " Behavior: Behavior normal.         Thought Content: Thought content normal.         Judgment: Judgment normal.          Physical Exam  Musculoskeletal exam was performed.     Results  Imaging  Herniated disk identified in the back.     Assessment/Plan   Issues Addressed/ Plan   Diagnosis Plan   1. Acute left-sided low back pain with left-sided sciatica  meloxicam (MOBIC) 15 MG tablet    metaxalone (SKELAXIN) 800 MG tablet      2. Piriformis syndrome, left  meloxicam (MOBIC) 15 MG tablet    metaxalone (SKELAXIN) 800 MG tablet         Assessment & Plan  1. Back pain.  The patient's back pain is likely due to a muscle strain incurred during a golfing activity, which subsequently led to a compensatory response and tightening of the piriformis muscle. This muscle strain has resulted in sciatica. The possibility of a herniated disc is considered less likely. He was advised to perform deep stretching exercises 3 to 4 times daily, holding each stretch for 10 to 20 seconds. A prescription for Skelaxin was provided, to be taken up to 3 times daily as tolerated. Additionally, meloxicam was prescribed as an anti-inflammatory agent. For breakthrough pain, he was instructed to take Tylenol or acetaminophen, with a maximum daily dosage of 4000 mg. He was also advised to avoid ibuprofen or Advil.    Follow-up  The patient will follow up in 1 week.           There are no Patient Instructions on file for this visit.   Follow up  recommended Return in about 1 week (around 4/2/2025).   - Dragon voice recognition software was utilized to complete this chart.  Every reasonable attempt was made to edit and correct the text, however some incorrect words may remain.   Han Mcconnell DO    Patient or patient representative verbalized consent for the use of Ambient Listening during the visit with  Han Mcconnell DO for chart documentation. 4/14/2025  11:27 EDT

## 2025-04-09 ENCOUNTER — OFFICE VISIT (OUTPATIENT)
Dept: FAMILY MEDICINE CLINIC | Facility: CLINIC | Age: 25
End: 2025-04-09
Payer: COMMERCIAL

## 2025-04-09 VITALS
OXYGEN SATURATION: 99 % | SYSTOLIC BLOOD PRESSURE: 144 MMHG | DIASTOLIC BLOOD PRESSURE: 76 MMHG | HEART RATE: 86 BPM | RESPIRATION RATE: 16 BRPM | HEIGHT: 67 IN | WEIGHT: 230 LBS | BODY MASS INDEX: 36.1 KG/M2

## 2025-04-09 DIAGNOSIS — M54.42 ACUTE LEFT-SIDED LOW BACK PAIN WITH LEFT-SIDED SCIATICA: Primary | ICD-10-CM

## 2025-04-09 PROCEDURE — 99213 OFFICE O/P EST LOW 20 MIN: CPT | Performed by: STUDENT IN AN ORGANIZED HEALTH CARE EDUCATION/TRAINING PROGRAM

## 2025-04-25 NOTE — PROGRESS NOTES
"    Han Mcconnell DO  [facility]  [address] [place] [address] [address]  [place] [id number] [phone]    Subjective      Name Shilo Lyon Jr. MRN [id number]    2000 AGE/SEX 24 y.o. / male      Chief Complaint Chief Complaint   Patient presents with    Back Pain     2 week follow up, still having pain but some days are worse than others          Visit History for  2025    Shilo Lyon Jr. is a 24 y.o. male who presented today for Back Pain (2 week follow up, still having pain but some days are worse than others )       History of Present Illness    He reports a fluctuating pattern of discomfort, with the most severe episodes occurring during periods of standing still. Despite the persistence of pain during ambulation, he notes an overall improvement since his last visit. He quantifies his current pain level as 5 on a scale of 1 to 10, a decrease from the previous rating of 6 to 7. He has been engaging in butterfly stretches as part of his self-management strategy. He also mentions occasional sitting on his work phone. He finds relief from a prescribed muscle relaxant, which he perceives as effective due to the noticeable difference when it wears off.       Medications and Allergies   Current Outpatient Medications   Medication Instructions    lisinopril (PRINIVIL,ZESTRIL) 5 mg, Oral, Daily    meloxicam (MOBIC) 15 mg, Oral, Daily     Allergies   Allergen Reactions    Amoxicillin Anaphylaxis    Penicillins Anaphylaxis      I have reviewed the above medications and allergies     Objective:      Vitals Vitals:    25 1118   BP: 144/76   BP Location: Left arm   Patient Position: Sitting   Cuff Size: Adult   Pulse: 86   Resp: 16   SpO2: 99%   Weight: 104 kg (230 lb)   Height: 170.2 cm (67.01\")     Body mass index is 36.01 kg/m².    Physical Exam     Physical Exam  Positive seated flexion test on the left side of the musculoskeletal system. Sacrum is rotated to the left. There " is a little trigger point in the musculoskeletal system.     Results       Assessment/Plan   Issues Addressed/ Plan   Diagnosis Plan   1. Acute left-sided low back pain with left-sided sciatica           Assessment & Plan  1. Back pain.  He reports improvement in his back pain, rating it at a 5 while sitting and higher when standing. The muscle relaxer has been effective. He was advised to avoid sitting on objects like a wallet or work phone and to refrain from hard seating, especially for prolonged periods. He was instructed to perform straight leg stretches daily and to continue the previously demonstrated exercises. He was informed that he might experience soreness the following day, but this should subside with time. He was encouraged to complete his current medication regimen over the next week. If his condition deteriorates, he should return for further evaluation.           There are no Patient Instructions on file for this visit.   Follow up  recommended Return in about 1 week (around 4/16/2025), or if symptoms worsen or fail to improve.   - Dragon voice recognition software was utilized to complete this chart.  Every reasonable attempt was made to edit and correct the text, however some incorrect words may remain.   Han Mcconnell DO    Patient or patient representative verbalized consent for the use of Ambient Listening during the visit with  Han Mcconnell DO for chart documentation. 4/24/2025  23:34 EDT

## 2025-07-10 ENCOUNTER — OFFICE VISIT (OUTPATIENT)
Dept: FAMILY MEDICINE CLINIC | Facility: CLINIC | Age: 25
End: 2025-07-10
Payer: COMMERCIAL

## 2025-07-10 VITALS
HEART RATE: 73 BPM | OXYGEN SATURATION: 98 % | HEIGHT: 67 IN | SYSTOLIC BLOOD PRESSURE: 134 MMHG | DIASTOLIC BLOOD PRESSURE: 78 MMHG | WEIGHT: 240 LBS | RESPIRATION RATE: 18 BRPM | BODY MASS INDEX: 37.67 KG/M2

## 2025-07-10 DIAGNOSIS — E66.812 CLASS 2 OBESITY DUE TO EXCESS CALORIES WITHOUT SERIOUS COMORBIDITY WITH BODY MASS INDEX (BMI) OF 37.0 TO 37.9 IN ADULT: Primary | ICD-10-CM

## 2025-07-10 DIAGNOSIS — E66.09 CLASS 2 OBESITY DUE TO EXCESS CALORIES WITHOUT SERIOUS COMORBIDITY WITH BODY MASS INDEX (BMI) OF 37.0 TO 37.9 IN ADULT: Primary | ICD-10-CM

## 2025-07-10 DIAGNOSIS — I10 HYPERTENSION, UNSPECIFIED TYPE: ICD-10-CM

## 2025-07-10 RX ORDER — LISINOPRIL 10 MG/1
10 TABLET ORAL DAILY
Qty: 30 TABLET | Refills: 2 | Status: SHIPPED | OUTPATIENT
Start: 2025-07-10

## 2025-07-10 NOTE — PROGRESS NOTES
Han Mcconnell DO  [facility]  [address]  [place] [id number] [phone]    Subjective      Name Shilo Lyon Jr. MRN [id number]    2000 AGE/SEX 24 y.o. / male      Chief Complaint Chief Complaint   Patient presents with    Hypertension     3 month check up          Visit History for  07/10/2025    Shilo Lyon Jr. is a 24 y.o. male who presented today for Hypertension (3 month check up )       History of Present Illness  The patient presents for evaluation of blood pressure and weight management.    He has been monitoring his blood pressure at home, which has occasionally been slightly elevated. His blood pressure typically remains around 140 or lower, with recent readings averaging around 135. He is concerned about the potential for his blood pressure to drop too low if he takes medication when it is in the upper 140s. He has been taking Lexapro 10 mg as needed, which appears to be effective in managing his blood pressure.    He has also noticed a slight weight gain, with his last recorded weight being 230 pounds. 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 His work schedule often results in him skipping meals and snacking throughout the day, leading to hunger by the time he gets home. He is aware that eating before bed is not recommended, but he struggles to avoid it due to his work schedule. He does not consume much caffeine, limiting himself to two cups of coffee per day, one at lunch and one before work.       Medications and Allergies   Current Outpatient Medications   Medication Instructions    lisinopril (PRINIVIL,ZESTRIL) 10 mg, Oral, Daily     Allergies   Allergen Reactions    Amoxicillin Anaphylaxis    Penicillins Anaphylaxis      I have reviewed the above medications and allergies     Objective:      Vitals Vitals:    07/10/25 1357  "  BP: 134/78   BP Location: Left arm   Patient Position: Sitting   Cuff Size: Adult   Pulse: 73   Resp: 18   SpO2: 98%   Weight: 109 kg (240 lb)   Height: 170.2 cm (67.01\")     Body mass index is 37.58 kg/m².    Physical Exam  Vitals reviewed.   Constitutional:       General: He is not in acute distress.     Appearance: He is not ill-appearing.   Cardiovascular:      Rate and Rhythm: Normal rate and regular rhythm.      Pulses: Normal pulses.      Heart sounds: Normal heart sounds.   Pulmonary:      Effort: Pulmonary effort is normal.   Psychiatric:         Mood and Affect: Mood normal.         Behavior: Behavior normal.         Thought Content: Thought content normal.         Judgment: Judgment normal.          Physical Exam       Results       Assessment/Plan   Issues Addressed/ Plan   Diagnosis Plan   1. Class 2 obesity due to excess calories without serious comorbidity with body mass index (BMI) of 37.0 to 37.9 in adult        2. Hypertension, unspecified type  lisinopril (PRINIVIL,ZESTRIL) 10 MG tablet         Assessment & Plan  1. Blood pressure management.  - Blood pressure readings have been consistently around 135, occasionally reaching upper 140s.  - Lexapro has been effective in managing elevated blood pressure.  - Discussed the importance of avoiding blood pressure readings in the 110s or 115s to prevent lightheadedness.  - Lexapro dosage will be increased to 10 mg; patient advised to report any lightheadedness.    2. Weight management.  - Recent weight gain noted, currently at 230 lbs.  - Elevated weight may be contributing to higher blood pressure.  - Advised to incorporate healthier snacks such as beef sticks or boiled eggs, and consume protein-rich foods in the morning like yogurt or granola.  - Recommended regular walking to help manage weight.           There are no Patient Instructions on file for this visit.   Follow up  recommended Return in about 3 months (around 10/10/2025) for Annual " physical.   - Dragon voice recognition software was utilized to complete this chart.  Every reasonable attempt was made to edit and correct the text, however some incorrect words may remain.   Han Mcconnell DO    Patient or patient representative verbalized consent for the use of Ambient Listening during the visit with  Han Mcconnell DO for chart documentation. 7/20/2025  22:06 EDT